# Patient Record
Sex: MALE | Race: WHITE | HISPANIC OR LATINO | URBAN - METROPOLITAN AREA
[De-identification: names, ages, dates, MRNs, and addresses within clinical notes are randomized per-mention and may not be internally consistent; named-entity substitution may affect disease eponyms.]

---

## 2022-01-29 ENCOUNTER — OFFICE VISIT (OUTPATIENT)
Dept: URGENT CARE | Facility: CLINIC | Age: 22
End: 2022-01-29

## 2022-01-29 DIAGNOSIS — M79.5 FOREIGN BODY (FB) IN SOFT TISSUE: Primary | ICD-10-CM

## 2022-01-29 PROCEDURE — 73090 X-RAY EXAM OF FOREARM: CPT | Mod: TIER,LT,S$GLB, | Performed by: RADIOLOGY

## 2022-01-29 PROCEDURE — 99499 UNLISTED E&M SERVICE: CPT | Mod: S$GLB,,, | Performed by: INTERNAL MEDICINE

## 2022-01-29 PROCEDURE — 99499 NO LOS: ICD-10-PCS | Mod: S$GLB,,, | Performed by: INTERNAL MEDICINE

## 2022-01-29 PROCEDURE — 73090 XR FOREARM LEFT: ICD-10-PCS | Mod: TIER,LT,S$GLB, | Performed by: RADIOLOGY

## 2022-01-29 NOTE — PATIENT INSTRUCTIONS
You are instructed to go straight to the nearest ER after leaving the urgent care. You have a nail embedded in your arm and this will need surgical removal. Go straight to the ER.     [facility]     [address] [place] [address]    Or     [facility] [address] [place] [address]

## 2022-01-30 NOTE — PROGRESS NOTES
Subjective:       Patient ID: Zev Johnson is a 21 y.o. male.    Vitals:  vitals were not taken for this visit.     Chief Complaint: No chief complaint on file.    Patient was working on cabinets at home and accidentally shot himself in arm with nail gun <1 hours ago.     ROS    Objective:      Physical Exam    patient has a puncture wound on the volar aspect of the left forearm. There is scant surrounding erythema. No sensation deficit,  strength normal. Full ROM of all fingers.     XR FOREARM LEFT    Result Date: 1/29/2022  EXAMINATION: XR FOREARM LEFT CLINICAL HISTORY: Residual foreign body in soft tissue TECHNIQUE: AP and lateral views of the left forearm were performed. COMPARISON: None FINDINGS: Corresponding to the clinically evident abnormality, there is radiopaque foreign body compatible with nail which appears imbedded within the mid aspect of the ulnar shaft with cortical buckling and suspected associated essentially nondisplaced fracture. No definite additional acute fractures are suggested.  Remainder of the examination otherwise negative for acute change.     As above. Electronically signed by: Jaron Foster Date:    01/29/2022 Time:    13:36    Assessment:       1. Foreign body (FB) in soft tissue          Plan:         Foreign body (FB) in soft tissue  -     XR FOREARM LEFT; Future; Expected date: 01/29/2022    Emergent evaluation of patient. Patient has embedded nail in soft tissue of forearm with cortical damage to ulna seen on XR. N/V intact. Patient instructed to proceed immediately to [facility] ED. Triage nurse notified. Patient went to [facility] and had surgery to remove foreign object later that afternoon .